# Patient Record
Sex: FEMALE | Race: WHITE | NOT HISPANIC OR LATINO | URBAN - METROPOLITAN AREA
[De-identification: names, ages, dates, MRNs, and addresses within clinical notes are randomized per-mention and may not be internally consistent; named-entity substitution may affect disease eponyms.]

---

## 2018-03-05 ENCOUNTER — FOLLOW-UP (OUTPATIENT)
Dept: URBAN - METROPOLITAN AREA CLINIC 18 | Facility: CLINIC | Age: 63
Setting detail: DERMATOLOGY
End: 2018-03-05

## 2018-03-05 DIAGNOSIS — Z41.8 ENCOUNTER FOR OTHER PROCEDURES FOR PURPOSES OTHER THAN REMEDYING HEALTH STATE: ICD-10-CM

## 2018-03-05 PROBLEM — D18.01 HEMANGIOMA OF SKIN AND SUBCUTANEOUS TISSUE: Status: RESOLVED | Noted: 2018-03-05

## 2018-03-05 PROBLEM — L82.1 OTHER SEBORRHEIC KERATOSIS: Status: RESOLVED | Noted: 2018-03-05

## 2018-03-05 PROBLEM — L57.0 ACTINIC KERATOSIS: Status: RESOLVED | Noted: 2018-03-05

## 2018-03-05 PROCEDURE — 17000 DESTRUCT PREMALG LESION: CPT

## 2018-03-05 PROCEDURE — 99213 OFFICE O/P EST LOW 20 MIN: CPT

## 2018-03-05 PROCEDURE — 17003 DESTRUCT PREMALG LES 2-14: CPT

## 2019-04-30 ENCOUNTER — COMPLETE SKIN EXAM (OUTPATIENT)
Dept: URBAN - METROPOLITAN AREA CLINIC 18 | Facility: CLINIC | Age: 64
Setting detail: DERMATOLOGY
End: 2019-04-30

## 2019-04-30 DIAGNOSIS — L30.9 DERMATITIS, UNSPECIFIED: ICD-10-CM

## 2019-04-30 PROBLEM — D18.01 HEMANGIOMA OF SKIN AND SUBCUTANEOUS TISSUE: Status: RESOLVED | Noted: 2019-04-30

## 2019-04-30 PROBLEM — L82.1 OTHER SEBORRHEIC KERATOSIS: Status: RESOLVED | Noted: 2019-04-30

## 2019-04-30 PROBLEM — L57.0 ACTINIC KERATOSIS: Status: RESOLVED | Noted: 2019-04-30

## 2019-04-30 PROCEDURE — 11102 TANGNTL BX SKIN SINGLE LES: CPT

## 2019-04-30 PROCEDURE — 99213 OFFICE O/P EST LOW 20 MIN: CPT

## 2019-04-30 PROCEDURE — 17000 DESTRUCT PREMALG LESION: CPT

## 2019-04-30 PROCEDURE — 17003 DESTRUCT PREMALG LES 2-14: CPT

## 2019-05-14 ENCOUNTER — CRYOTHERAPY (OUTPATIENT)
Dept: URBAN - METROPOLITAN AREA CLINIC 18 | Facility: CLINIC | Age: 64
Setting detail: DERMATOLOGY
End: 2019-05-14

## 2019-05-14 DIAGNOSIS — L30.9 DERMATITIS, UNSPECIFIED: ICD-10-CM

## 2019-05-14 PROCEDURE — 17003 DESTRUCT PREMALG LES 2-14: CPT

## 2019-05-14 PROCEDURE — 17000 DESTRUCT PREMALG LESION: CPT

## 2020-10-07 ENCOUNTER — COMPLETE SKIN EXAM (OUTPATIENT)
Dept: URBAN - METROPOLITAN AREA CLINIC 18 | Facility: CLINIC | Age: 65
Setting detail: DERMATOLOGY
End: 2020-10-07

## 2020-10-07 DIAGNOSIS — L81.4 OTHER MELANIN HYPERPIGMENTATION: ICD-10-CM

## 2020-10-07 DIAGNOSIS — L82.1 OTHER SEBORRHEIC KERATOSIS: ICD-10-CM

## 2020-10-07 PROBLEM — D18.01 HEMANGIOMA OF SKIN AND SUBCUTANEOUS TISSUE: Status: RESOLVED | Noted: 2020-10-07

## 2020-10-07 PROBLEM — L57.0 ACTINIC KERATOSIS: Status: RESOLVED | Noted: 2020-10-07

## 2020-10-07 PROCEDURE — 99213 OFFICE O/P EST LOW 20 MIN: CPT

## 2020-10-07 PROCEDURE — 17000 DESTRUCT PREMALG LESION: CPT

## 2021-02-26 ENCOUNTER — OFFICE (OUTPATIENT)
Dept: URBAN - METROPOLITAN AREA CLINIC 107 | Facility: CLINIC | Age: 66
End: 2021-02-26
Payer: OTHER GOVERNMENT

## 2021-02-26 VITALS
HEIGHT: 63 IN | DIASTOLIC BLOOD PRESSURE: 78 MMHG | TEMPERATURE: 97.2 F | WEIGHT: 166 LBS | HEART RATE: 65 BPM | SYSTOLIC BLOOD PRESSURE: 132 MMHG

## 2021-02-26 DIAGNOSIS — E78.00 PURE HYPERCHOLESTEROLEMIA, UNSPECIFIED: ICD-10-CM

## 2021-02-26 DIAGNOSIS — E53.8 DEFICIENCY OF OTHER SPECIFIED B GROUP VITAMINS: ICD-10-CM

## 2021-02-26 DIAGNOSIS — I10 ESSENTIAL (PRIMARY) HYPERTENSION: ICD-10-CM

## 2021-02-26 DIAGNOSIS — K51.00 ULCERATIVE (CHRONIC) PANCOLITIS WITHOUT COMPLICATIONS: ICD-10-CM

## 2021-02-26 DIAGNOSIS — E88.81 METABOLIC SYNDROME AND OTHER INSULIN RESISTANCE: ICD-10-CM

## 2021-02-26 DIAGNOSIS — D75.89 OTHER SPECIFIED DISEASES OF BLOOD AND BLOOD-FORMING ORGANS: ICD-10-CM

## 2021-02-26 PROCEDURE — 99214 OFFICE O/P EST MOD 30 MIN: CPT | Performed by: INTERNAL MEDICINE

## 2021-02-26 RX ORDER — MESALAMINE 0.38 G/1
CAPSULE, EXTENDED RELEASE ORAL
Qty: 360 | Refills: 3 | Status: COMPLETED
Start: 2021-02-26 | End: 2021-12-30 | Stop reason: SDUPTHER

## 2021-02-26 NOTE — SERVICENOTES
Our goal is to partner with you to improve your health and well being. It is important for you to complete necessary testing and follow the instructions given to you at your clinic visit. Our office will call you within 2 weeks with results of any testing but you may also call sooner to obtain results [phone].   If you have any questions or concerns please feel free to call.  We take your care very seriously and we thank you for your trust!
- labs today
- we will wait list you for colonoscopy in 1/2022
- continue mesalamine ER 0.375 4 pills a day
- follow up in 6-12 months
- continue current supplements.

## 2021-02-26 NOTE — SERVICEHPINOTES
Sierra Lopes   is seen today for a follow-up visit.     64 year old femal previously a patient of Dr. Ledezma here to establish care with us 1/2020 due to history of chronic ulcerative coltiis. She also has a history of diverticulitis. SHe smokes 1/2 ppd and drinks 2-3 alcoholic beverages a day.  BRnormal bowel movements, no blood in the stool, no abdominal pain.BR  Plan from last visit: 1/2020- labs nowBR- colonoscopy with fibroscan in MarchBR- continue mesalamine ER 4 pills a d ayBR- follow-up in 6-12 months, repeat labs in 6 monthInterval History:  2/26/2021  Carmen is here for follow up.  Hermelindo  was realy sick this last summer for RMSFBRHEr bowels are good.  Somedays she may have some more frequent.  She seems to have it worse when she goes out.  occasional urgency with the stool.  No blood in the stool.  No night time stooling. Carmen is taking the mesalamine 4 pills a day. Carmen is taking b12 and L-methyfolate and feels like she has more energy on those.    My nurse has reviewed and updated the medication list with the patient (medication reconciliation). I have also reviewed the medication list. New updates were made to the patient's medical, social and family history. Pertinent details are also noted above in the HPI.Jane

## 2021-02-27 LAB
CBC WITH DIFFERENTIAL/PLATELET: BASO (ABSOLUTE): 0.1 X10E3/UL (ref 0–0.2)
CBC WITH DIFFERENTIAL/PLATELET: BASOS: 1 %
CBC WITH DIFFERENTIAL/PLATELET: EOS (ABSOLUTE): 0.3 X10E3/UL (ref 0–0.4)
CBC WITH DIFFERENTIAL/PLATELET: EOS: 3 %
CBC WITH DIFFERENTIAL/PLATELET: HEMATOCRIT: 44.7 % (ref 34–46.6)
CBC WITH DIFFERENTIAL/PLATELET: HEMATOLOGY COMMENTS: (no result)
CBC WITH DIFFERENTIAL/PLATELET: HEMOGLOBIN: 14.5 G/DL (ref 11.1–15.9)
CBC WITH DIFFERENTIAL/PLATELET: IMMATURE CELLS: (no result)
CBC WITH DIFFERENTIAL/PLATELET: IMMATURE GRANS (ABS): 0 X10E3/UL (ref 0–0.1)
CBC WITH DIFFERENTIAL/PLATELET: IMMATURE GRANULOCYTES: 0 %
CBC WITH DIFFERENTIAL/PLATELET: LYMPHS (ABSOLUTE): 3.3 X10E3/UL — HIGH (ref 0.7–3.1)
CBC WITH DIFFERENTIAL/PLATELET: LYMPHS: 40 %
CBC WITH DIFFERENTIAL/PLATELET: MCH: 31.6 PG (ref 26.6–33)
CBC WITH DIFFERENTIAL/PLATELET: MCHC: 32.4 G/DL (ref 31.5–35.7)
CBC WITH DIFFERENTIAL/PLATELET: MCV: 97 FL (ref 79–97)
CBC WITH DIFFERENTIAL/PLATELET: MONOCYTES(ABSOLUTE): 0.6 X10E3/UL (ref 0.1–0.9)
CBC WITH DIFFERENTIAL/PLATELET: MONOCYTES: 7 %
CBC WITH DIFFERENTIAL/PLATELET: NEUTROPHILS (ABSOLUTE): 4.1 X10E3/UL (ref 1.4–7)
CBC WITH DIFFERENTIAL/PLATELET: NEUTROPHILS: 49 %
CBC WITH DIFFERENTIAL/PLATELET: NRBC: (no result)
CBC WITH DIFFERENTIAL/PLATELET: PLATELETS: 285 X10E3/UL (ref 150–450)
CBC WITH DIFFERENTIAL/PLATELET: RBC: 4.59 X10E6/UL (ref 3.77–5.28)
CBC WITH DIFFERENTIAL/PLATELET: RDW: 13 % (ref 11.7–15.4)
CBC WITH DIFFERENTIAL/PLATELET: WBC: 8.3 X10E3/UL (ref 3.4–10.8)
COMP. METABOLIC PANEL (14): A/G RATIO: 1.7 (ref 1.2–2.2)
COMP. METABOLIC PANEL (14): ALBUMIN: 4.5 G/DL (ref 3.8–4.8)
COMP. METABOLIC PANEL (14): ALKALINE PHOSPHATASE: 31 IU/L — LOW (ref 39–117)
COMP. METABOLIC PANEL (14): ALT (SGPT): 28 IU/L (ref 0–32)
COMP. METABOLIC PANEL (14): AST (SGOT): 31 IU/L (ref 0–40)
COMP. METABOLIC PANEL (14): BILIRUBIN, TOTAL: 0.5 MG/DL (ref 0–1.2)
COMP. METABOLIC PANEL (14): BUN/CREATININE RATIO: 18 (ref 12–28)
COMP. METABOLIC PANEL (14): BUN: 15 MG/DL (ref 8–27)
COMP. METABOLIC PANEL (14): CALCIUM: 9.9 MG/DL (ref 8.7–10.3)
COMP. METABOLIC PANEL (14): CARBON DIOXIDE, TOTAL: 23 MMOL/L (ref 20–29)
COMP. METABOLIC PANEL (14): CHLORIDE: 100 MMOL/L (ref 96–106)
COMP. METABOLIC PANEL (14): CREATININE: 0.84 MG/DL (ref 0.57–1)
COMP. METABOLIC PANEL (14): EGFR IF AFRICN AM: 84 ML/MIN/1.73 (ref 59–?)
COMP. METABOLIC PANEL (14): EGFR IF NONAFRICN AM: 73 ML/MIN/1.73 (ref 59–?)
COMP. METABOLIC PANEL (14): GLOBULIN, TOTAL: 2.7 G/DL (ref 1.5–4.5)
COMP. METABOLIC PANEL (14): GLUCOSE: 92 MG/DL (ref 65–99)
COMP. METABOLIC PANEL (14): POTASSIUM: 4.6 MMOL/L (ref 3.5–5.2)
COMP. METABOLIC PANEL (14): PROTEIN, TOTAL: 7.2 G/DL (ref 6–8.5)
COMP. METABOLIC PANEL (14): SODIUM: 139 MMOL/L (ref 134–144)
FOLATE (FOLIC ACID), SERUM: >20 NG/ML

## 2021-10-07 ENCOUNTER — FOLLOW-UP (OUTPATIENT)
Dept: URBAN - METROPOLITAN AREA CLINIC 18 | Facility: CLINIC | Age: 66
Setting detail: DERMATOLOGY
End: 2021-10-07

## 2021-10-07 DIAGNOSIS — L70.0 ACNE VULGARIS: ICD-10-CM

## 2021-10-07 PROBLEM — D18.01 HEMANGIOMA OF SKIN AND SUBCUTANEOUS TISSUE: Status: RESOLVED | Noted: 2021-10-07

## 2021-10-07 PROBLEM — L82.1 OTHER SEBORRHEIC KERATOSIS: Status: RESOLVED | Noted: 2021-10-07

## 2021-10-07 PROCEDURE — 99213 OFFICE O/P EST LOW 20 MIN: CPT

## 2022-01-27 ENCOUNTER — OFFICE (OUTPATIENT)
Dept: URBAN - METROPOLITAN AREA CLINIC 72 | Facility: CLINIC | Age: 67
End: 2022-01-27
Payer: OTHER GOVERNMENT

## 2022-01-27 VITALS — DIASTOLIC BLOOD PRESSURE: 81 MMHG | SYSTOLIC BLOOD PRESSURE: 142 MMHG | HEART RATE: 67 BPM | HEIGHT: 63 IN

## 2022-01-27 DIAGNOSIS — K51.90 ULCERATIVE COLITIS, UNSPECIFIED, WITHOUT COMPLICATIONS: ICD-10-CM

## 2022-01-27 DIAGNOSIS — E53.8 DEFICIENCY OF OTHER SPECIFIED B GROUP VITAMINS: ICD-10-CM

## 2022-01-27 DIAGNOSIS — D75.89 OTHER SPECIFIED DISEASES OF BLOOD AND BLOOD-FORMING ORGANS: ICD-10-CM

## 2022-01-27 PROCEDURE — 99214 OFFICE O/P EST MOD 30 MIN: CPT | Performed by: INTERNAL MEDICINE

## 2022-01-27 RX ORDER — SODIUM SULFATE, MAGNESIUM SULFATE, AND POTASSIUM CHLORIDE 17.75; 2.7; 2.25 G/1; G/1; G/1
TABLET ORAL
Qty: 1 | Refills: 0 | Status: ACTIVE
Start: 2022-01-27

## 2022-01-27 RX ORDER — MESALAMINE 0.38 G/1
CAPSULE, EXTENDED RELEASE ORAL
Qty: 360 | Refills: 0 | Status: ACTIVE

## 2022-01-27 NOTE — SERVICEHPINOTES
Sierra Lopes   is seen today for a follow-up visit.  
br
br66 year old femal previously a patient of Dr. Ledezma here to establish care with us 1/2020 due to history of chronic ulcerative coltiis. She also has a history of diverticulitis. SHe smokes 1/2 ppd and drinks 2-3 alcoholic beverages a day.brnormal bowel movements, no blood in the stool, no abdominal pain.brPlan from last visit: 1/2020- labs nowbr- colonoscopy with fibroscan in Marchbr- continue mesalamine ER 4 pills a d aybr- follow-up in 6-12 months, repeat labs in 6 monthInterval History:2/26/2021brSgreyson is here for follow up.Livan  was really sick this last summer for RMSFbrHEr bowels are good. Somedays she may have some more frequent. She seems to have it worse when she goes out. occasional urgency with the stool. No blood in the stool. No night time stooling.brShe is taking the mesalamine 4 pills a day.brshe is taking b12 and L-methyfolate and feels like she has more energy on those.br    br  Plan from last visit:
br
br- labs todaybr- we will wait list you for colonoscopy in 1/2022br- continue mesalamine ER 0.375 4 pills a daybr- follow up in 6-12 monthsbr- continue current supplements. Interval History:  1/27/2022   
br   labs 2/2021 with normal cbc, cmp and foalte
br She is on apriso 4 pills a day, no side effectis or issues. 
br BM are regular. 
br She is currently taking vit D 2000, b12, vitamin C, zinc, l-methylfolateMy nurse has reviewed and updated the medication list with the patient (medication reconciliation). I have also reviewed the medication list. New updates were made to the patient's medical, social and family history. Pertinent details are also noted above in the HPI.br visited="true"

## 2022-01-27 NOTE — SERVICENOTES
Our goal is to partner with you to improve your health and well being. It is important for you to complete necessary testing and follow the instructions given to you at your clinic visit. Our office will call you within 2 weeks with results of any testing but you may also call sooner to obtain results [phone].   If you have any questions or concerns please feel free to call.  We take your care very seriously and we thank you for your trust!
- I would like you to get pneumovax and prevnar
- schedule colonoscopy, if you have issue with prep (cost at pharmacy) please let me know
- labs
- continue currently dose mesalamine

## 2022-02-09 ENCOUNTER — OFFICE (OUTPATIENT)
Dept: URBAN - METROPOLITAN AREA PATHOLOGY 24 | Facility: PATHOLOGY | Age: 67
End: 2022-02-09
Payer: OTHER GOVERNMENT

## 2022-02-09 ENCOUNTER — AMBULATORY SURGICAL CENTER (OUTPATIENT)
Dept: URBAN - METROPOLITAN AREA SURGERY 19 | Facility: SURGERY | Age: 67
End: 2022-02-09
Payer: OTHER GOVERNMENT

## 2022-02-09 DIAGNOSIS — K51.90 ULCERATIVE COLITIS, UNSPECIFIED, WITHOUT COMPLICATIONS: ICD-10-CM

## 2022-02-09 DIAGNOSIS — K57.30 DIVERTICULOSIS OF LARGE INTESTINE WITHOUT PERFORATION OR ABS: ICD-10-CM

## 2022-02-09 DIAGNOSIS — K64.8 OTHER HEMORRHOIDS: ICD-10-CM

## 2022-02-09 DIAGNOSIS — K51.40 INFLAMMATORY POLYPS OF COLON WITHOUT COMPLICATIONS: ICD-10-CM

## 2022-02-09 LAB
COLONOSCOPY STUDY: (no result)
COLONOSCOPY STUDY: (no result)

## 2022-02-09 PROCEDURE — 88305 TISSUE EXAM BY PATHOLOGIST: CPT

## 2022-02-09 PROCEDURE — 45380 COLONOSCOPY AND BIOPSY: CPT | Performed by: INTERNAL MEDICINE

## 2022-02-09 PROCEDURE — 88342 IMHCHEM/IMCYTCHM 1ST ANTB: CPT

## 2022-10-07 ENCOUNTER — APPOINTMENT (OUTPATIENT)
Dept: URBAN - METROPOLITAN AREA SURGERY 11 | Age: 67
Setting detail: DERMATOLOGY
End: 2022-10-07

## 2022-10-07 DIAGNOSIS — L57.0 ACTINIC KERATOSIS: ICD-10-CM

## 2022-10-07 DIAGNOSIS — D22 MELANOCYTIC NEVI: ICD-10-CM

## 2022-10-07 DIAGNOSIS — D18.0 HEMANGIOMA: ICD-10-CM

## 2022-10-07 DIAGNOSIS — Z71.89 OTHER SPECIFIED COUNSELING: ICD-10-CM

## 2022-10-07 DIAGNOSIS — L82.1 OTHER SEBORRHEIC KERATOSIS: ICD-10-CM

## 2022-10-07 DIAGNOSIS — L81.4 OTHER MELANIN HYPERPIGMENTATION: ICD-10-CM

## 2022-10-07 PROBLEM — D18.01 HEMANGIOMA OF SKIN AND SUBCUTANEOUS TISSUE: Status: ACTIVE | Noted: 2022-10-07

## 2022-10-07 PROBLEM — D22.9 MELANOCYTIC NEVI, UNSPECIFIED: Status: ACTIVE | Noted: 2022-10-07

## 2022-10-07 PROCEDURE — OTHER REASSURANCE: OTHER

## 2022-10-07 PROCEDURE — 17000 DESTRUCT PREMALG LESION: CPT

## 2022-10-07 PROCEDURE — OTHER SUNSCREEN RECOMMENDATIONS: OTHER

## 2022-10-07 PROCEDURE — OTHER LIQUID NITROGEN: OTHER

## 2022-10-07 PROCEDURE — OTHER COUNSELING: OTHER

## 2022-10-07 PROCEDURE — 99213 OFFICE O/P EST LOW 20 MIN: CPT | Mod: 25

## 2022-10-07 PROCEDURE — OTHER MIPS QUALITY: OTHER

## 2022-10-07 PROCEDURE — 17003 DESTRUCT PREMALG LES 2-14: CPT

## 2022-10-07 ASSESSMENT — LOCATION SIMPLE DESCRIPTION DERM
LOCATION SIMPLE: RIGHT TEMPLE
LOCATION SIMPLE: LEFT CLAVICULAR SKIN

## 2022-10-07 ASSESSMENT — LOCATION ZONE DERM
LOCATION ZONE: FACE
LOCATION ZONE: TRUNK

## 2022-10-07 ASSESSMENT — LOCATION DETAILED DESCRIPTION DERM
LOCATION DETAILED: RIGHT MID TEMPLE
LOCATION DETAILED: LEFT CLAVICULAR SKIN

## 2022-10-07 NOTE — PROCEDURE: MIPS QUALITY
Quality 110: Preventive Care And Screening: Influenza Immunization: Influenza Immunization not Administered because Patient Refused.
Quality 226: Preventive Care And Screening: Tobacco Use: Screening And Cessation Intervention: Patient screened for tobacco use, is a smoker AND did not received Cessation Counseling for Unknown Reasons within the Previous 12 Months
Detail Level: Detailed

## 2022-10-07 NOTE — PROCEDURE: LIQUID NITROGEN
Duration Of Freeze Thaw-Cycle (Seconds): 0
Detail Level: Detailed
Show Applicator Variable?: Yes
Consent: The patient's consent was obtained including but not limited to risks of crusting, scabbing, blistering, scarring, darker or lighter pigmentary change, recurrence, incomplete removal and infection.
Render Note In Bullet Format When Appropriate: No
Post-Care Instructions: I reviewed with the patient in detail post-care instructions. Patient is to wear sunprotection, and avoid picking at any of the treated lesions. Pt may apply Vaseline to crusted or scabbing areas.

## 2023-02-13 ENCOUNTER — OFFICE (OUTPATIENT)
Dept: URBAN - METROPOLITAN AREA CLINIC 72 | Facility: CLINIC | Age: 68
End: 2023-02-13
Payer: OTHER GOVERNMENT

## 2023-02-13 VITALS
OXYGEN SATURATION: 93 % | DIASTOLIC BLOOD PRESSURE: 72 MMHG | SYSTOLIC BLOOD PRESSURE: 132 MMHG | WEIGHT: 200 LBS | HEIGHT: 63 IN | HEART RATE: 68 BPM

## 2023-02-13 DIAGNOSIS — K51.90 ULCERATIVE COLITIS, UNSPECIFIED, WITHOUT COMPLICATIONS: ICD-10-CM

## 2023-02-13 DIAGNOSIS — K51.00 ULCERATIVE (CHRONIC) PANCOLITIS WITHOUT COMPLICATIONS: ICD-10-CM

## 2023-02-13 PROCEDURE — 99214 OFFICE O/P EST MOD 30 MIN: CPT | Performed by: NURSE PRACTITIONER

## 2023-02-13 RX ORDER — MESALAMINE 0.38 G/1
CAPSULE, EXTENDED RELEASE ORAL
Qty: 360 | Refills: 0 | Status: ACTIVE

## 2023-04-04 ENCOUNTER — OFFICE (OUTPATIENT)
Dept: URBAN - METROPOLITAN AREA PATHOLOGY 10 | Facility: PATHOLOGY | Age: 68
End: 2023-04-04
Payer: OTHER GOVERNMENT

## 2023-04-04 ENCOUNTER — AMBULATORY SURGICAL CENTER (OUTPATIENT)
Dept: URBAN - METROPOLITAN AREA SURGERY 19 | Facility: SURGERY | Age: 68
End: 2023-04-04
Payer: OTHER GOVERNMENT

## 2023-04-04 DIAGNOSIS — K63.5 POLYP OF COLON: ICD-10-CM

## 2023-04-04 DIAGNOSIS — K51.90 ULCERATIVE COLITIS, UNSPECIFIED, WITHOUT COMPLICATIONS: ICD-10-CM

## 2023-04-04 LAB
COLONOSCOPY STUDY: (no result)
COLONOSCOPY STUDY: (no result)

## 2023-04-04 PROCEDURE — 88305 TISSUE EXAM BY PATHOLOGIST: CPT | Performed by: PATHOLOGY

## 2023-04-04 PROCEDURE — 45380 COLONOSCOPY AND BIOPSY: CPT | Performed by: INTERNAL MEDICINE

## 2023-04-04 PROCEDURE — 45381 COLONOSCOPY SUBMUCOUS NJX: CPT | Mod: 51 | Performed by: INTERNAL MEDICINE

## 2023-11-10 ENCOUNTER — APPOINTMENT (OUTPATIENT)
Dept: URBAN - METROPOLITAN AREA SURGERY 11 | Age: 68
Setting detail: DERMATOLOGY
End: 2023-11-10

## 2023-11-10 DIAGNOSIS — D485 NEOPLASM OF UNCERTAIN BEHAVIOR OF SKIN: ICD-10-CM

## 2023-11-10 DIAGNOSIS — D18.0 HEMANGIOMA: ICD-10-CM

## 2023-11-10 DIAGNOSIS — D22 MELANOCYTIC NEVI: ICD-10-CM

## 2023-11-10 DIAGNOSIS — L81.4 OTHER MELANIN HYPERPIGMENTATION: ICD-10-CM

## 2023-11-10 DIAGNOSIS — Z71.89 OTHER SPECIFIED COUNSELING: ICD-10-CM

## 2023-11-10 DIAGNOSIS — L82.1 OTHER SEBORRHEIC KERATOSIS: ICD-10-CM

## 2023-11-10 DIAGNOSIS — L57.0 ACTINIC KERATOSIS: ICD-10-CM

## 2023-11-10 PROBLEM — D18.01 HEMANGIOMA OF SKIN AND SUBCUTANEOUS TISSUE: Status: ACTIVE | Noted: 2023-11-10

## 2023-11-10 PROBLEM — D48.5 NEOPLASM OF UNCERTAIN BEHAVIOR OF SKIN: Status: ACTIVE | Noted: 2023-11-10

## 2023-11-10 PROBLEM — D22.9 MELANOCYTIC NEVI, UNSPECIFIED: Status: ACTIVE | Noted: 2023-11-10

## 2023-11-10 PROCEDURE — OTHER ADDITIONAL NOTES: OTHER

## 2023-11-10 PROCEDURE — OTHER REASSURANCE: OTHER

## 2023-11-10 PROCEDURE — OTHER MIPS QUALITY: OTHER

## 2023-11-10 PROCEDURE — 17000 DESTRUCT PREMALG LESION: CPT

## 2023-11-10 PROCEDURE — 17003 DESTRUCT PREMALG LES 2-14: CPT

## 2023-11-10 PROCEDURE — OTHER LIQUID NITROGEN: OTHER

## 2023-11-10 PROCEDURE — OTHER COUNSELING: OTHER

## 2023-11-10 PROCEDURE — OTHER SUNSCREEN RECOMMENDATIONS: OTHER

## 2023-11-10 PROCEDURE — 99213 OFFICE O/P EST LOW 20 MIN: CPT | Mod: 25

## 2023-11-10 ASSESSMENT — LOCATION SIMPLE DESCRIPTION DERM
LOCATION SIMPLE: RIGHT CHEEK
LOCATION SIMPLE: LEFT FOREHEAD
LOCATION SIMPLE: RIGHT FOREHEAD
LOCATION SIMPLE: LEFT NOSE
LOCATION SIMPLE: LEFT CHEEK
LOCATION SIMPLE: RIGHT TEMPLE
LOCATION SIMPLE: NOSE

## 2023-11-10 ASSESSMENT — LOCATION DETAILED DESCRIPTION DERM
LOCATION DETAILED: NASAL DORSUM
LOCATION DETAILED: LEFT SUPERIOR LATERAL MALAR CHEEK
LOCATION DETAILED: RIGHT SUPERIOR MEDIAL MALAR CHEEK
LOCATION DETAILED: LEFT SUPERIOR PREAURICULAR CHEEK
LOCATION DETAILED: RIGHT FOREHEAD
LOCATION DETAILED: LEFT INFERIOR LATERAL FOREHEAD
LOCATION DETAILED: NASAL SUPRATIP
LOCATION DETAILED: LEFT NASAL SIDEWALL
LOCATION DETAILED: RIGHT MID TEMPLE
LOCATION DETAILED: RIGHT LATERAL MALAR CHEEK

## 2023-11-10 ASSESSMENT — LOCATION ZONE DERM
LOCATION ZONE: NOSE
LOCATION ZONE: NOSE
LOCATION ZONE: FACE

## 2023-11-10 NOTE — PROCEDURE: ADDITIONAL NOTES
Detail Level: Simple
Additional Notes: Pt report a bump long time ago > 1 yr unsure exactly how long. Left a \"hole\" in her nose, but unchanged according to pt since then. Photo today and will observe. RTC if any changes but will recheck with skin check in 6mo or sooner if needed.
Render Risk Assessment In Note?: no
General

## 2024-05-10 ENCOUNTER — APPOINTMENT (OUTPATIENT)
Dept: URBAN - METROPOLITAN AREA SURGERY 11 | Age: 69
Setting detail: DERMATOLOGY
End: 2024-05-10

## 2024-05-10 DIAGNOSIS — L81.4 OTHER MELANIN HYPERPIGMENTATION: ICD-10-CM

## 2024-05-10 DIAGNOSIS — D22 MELANOCYTIC NEVI: ICD-10-CM

## 2024-05-10 DIAGNOSIS — Z71.89 OTHER SPECIFIED COUNSELING: ICD-10-CM

## 2024-05-10 DIAGNOSIS — L82.1 OTHER SEBORRHEIC KERATOSIS: ICD-10-CM

## 2024-05-10 DIAGNOSIS — D18.0 HEMANGIOMA: ICD-10-CM

## 2024-05-10 PROBLEM — D18.01 HEMANGIOMA OF SKIN AND SUBCUTANEOUS TISSUE: Status: ACTIVE | Noted: 2024-05-10

## 2024-05-10 PROBLEM — D22.9 MELANOCYTIC NEVI, UNSPECIFIED: Status: ACTIVE | Noted: 2024-05-10

## 2024-05-10 PROCEDURE — OTHER SUNSCREEN RECOMMENDATIONS: OTHER

## 2024-05-10 PROCEDURE — OTHER REASSURANCE: OTHER

## 2024-05-10 PROCEDURE — 99213 OFFICE O/P EST LOW 20 MIN: CPT

## 2024-05-10 PROCEDURE — OTHER COUNSELING: OTHER

## 2025-05-16 ENCOUNTER — APPOINTMENT (OUTPATIENT)
Dept: URBAN - METROPOLITAN AREA SURGERY 11 | Age: 70
Setting detail: DERMATOLOGY
End: 2025-05-16

## 2025-05-16 DIAGNOSIS — L81.4 OTHER MELANIN HYPERPIGMENTATION: ICD-10-CM

## 2025-05-16 DIAGNOSIS — Z71.89 OTHER SPECIFIED COUNSELING: ICD-10-CM

## 2025-05-16 DIAGNOSIS — D18.0 HEMANGIOMA: ICD-10-CM

## 2025-05-16 DIAGNOSIS — D22 MELANOCYTIC NEVI: ICD-10-CM

## 2025-05-16 DIAGNOSIS — L82.1 OTHER SEBORRHEIC KERATOSIS: ICD-10-CM

## 2025-05-16 PROBLEM — D22.9 MELANOCYTIC NEVI, UNSPECIFIED: Status: ACTIVE | Noted: 2025-05-16

## 2025-05-16 PROBLEM — D18.01 HEMANGIOMA OF SKIN AND SUBCUTANEOUS TISSUE: Status: ACTIVE | Noted: 2025-05-16

## 2025-05-16 PROCEDURE — OTHER REASSURANCE: OTHER

## 2025-05-16 PROCEDURE — 99213 OFFICE O/P EST LOW 20 MIN: CPT

## 2025-05-16 PROCEDURE — OTHER SUNSCREEN RECOMMENDATIONS: OTHER

## 2025-05-16 PROCEDURE — OTHER COUNSELING: OTHER
